# Patient Record
Sex: FEMALE | Race: BLACK OR AFRICAN AMERICAN | NOT HISPANIC OR LATINO | Employment: OTHER | URBAN - METROPOLITAN AREA
[De-identification: names, ages, dates, MRNs, and addresses within clinical notes are randomized per-mention and may not be internally consistent; named-entity substitution may affect disease eponyms.]

---

## 2018-12-30 ENCOUNTER — HOSPITAL ENCOUNTER (EMERGENCY)
Facility: OTHER | Age: 33
Discharge: HOME | End: 2018-12-30
Attending: EMERGENCY MEDICINE
Payer: MEDICAID

## 2018-12-30 VITALS
HEART RATE: 61 BPM | HEIGHT: 66 IN | OXYGEN SATURATION: 99 % | WEIGHT: 182 LBS | SYSTOLIC BLOOD PRESSURE: 140 MMHG | RESPIRATION RATE: 16 BRPM | TEMPERATURE: 99 F | DIASTOLIC BLOOD PRESSURE: 61 MMHG | BODY MASS INDEX: 29.25 KG/M2

## 2018-12-30 DIAGNOSIS — N94.9 ROUND LIGAMENT PAIN: Primary | ICD-10-CM

## 2018-12-30 LAB
B-HCG UR QL: POSITIVE
BILIRUB UR QL STRIP: NEGATIVE
CLARITY UR: CLEAR
COLOR UR: YELLOW
CTP QC/QA: YES
GLUCOSE UR QL STRIP: NEGATIVE
HGB UR QL STRIP: NEGATIVE
KETONES UR QL STRIP: NEGATIVE
LEUKOCYTE ESTERASE UR QL STRIP: NEGATIVE
NITRITE UR QL STRIP: NEGATIVE
PH UR STRIP: 6 [PH] (ref 5–8)
PROT UR QL STRIP: NEGATIVE
SP GR UR STRIP: 1.02 (ref 1–1.03)
URN SPEC COLLECT METH UR: NORMAL
UROBILINOGEN UR STRIP-ACNC: NEGATIVE EU/DL

## 2018-12-30 PROCEDURE — 81025 URINE PREGNANCY TEST: CPT | Performed by: EMERGENCY MEDICINE

## 2018-12-30 PROCEDURE — 99281 EMR DPT VST MAYX REQ PHY/QHP: CPT

## 2018-12-30 PROCEDURE — 25000003 PHARM REV CODE 250: Performed by: EMERGENCY MEDICINE

## 2018-12-30 PROCEDURE — 81003 URINALYSIS AUTO W/O SCOPE: CPT

## 2018-12-30 RX ORDER — ACETAMINOPHEN 500 MG
1000 TABLET ORAL
Status: COMPLETED | OUTPATIENT
Start: 2018-12-30 | End: 2018-12-30

## 2018-12-30 RX ADMIN — ACETAMINOPHEN 1000 MG: 500 TABLET, FILM COATED ORAL at 01:12

## 2018-12-30 NOTE — ED NOTES
Low bdomen pain and wraps around to the back, pain only to rt side. Pain started this am and has gotten worse. Denies N/V/D. No vaginal bleeding or discharge  LOC: Pt is awake alert and aware of environment, oriented X3 and speaking appropriately  Appearance: Pt is in no acute distress, Pt is well groomed and clean  Skin: skin is warm and dry with normal turgor, mucus membranes are moist and pink, skin is intact with no bruising or breakdown  Muskuloskeletal: Pt moves all extremities well, there is no obvious swelling or deformities noted, pulses are intact.  Respiratory: Airway is open and patent, respirations are spontaneous and even.  Cardiac: normal rate and rhythm, no edema and cap refill is <3sec  Abdomen: soft, slight tenderness to low rt side and mid abd tender and non-distended  Neuro: Pt follows commands easily and has no obvious deficits

## 2018-12-30 NOTE — ED PROVIDER NOTES
Encounter Date: 2018    SCRIBE #1 NOTE: Vasquez FERNANDEZ, darci scribing for, and in the presence of, Dr. Giordano.       History     Chief Complaint   Patient presents with    Abdominal Cramping     10 weeks pregnant. started this morning. denies any vaginal bleeding     Seen by provider: 12:55 AM    Patient is a 23 y.o. Female, A0 approximately 10 weeks gestational age, who presents to the ED with complaint of right sided lower abdominal pain which began this morning prior to arrival and has progressively worsened since onset. She rates the pain 10/10 in severity. She also reports dysuria and frequency. She denies fever, nausea, vomiting, diarrhea, vaginal bleeding or discharge. She denies experiencing similar pain in the past and has not had any pain in her pregnancy thus far. She denies any pregnancies with past pregnancies. She reports no major medical problems. She is here visiting from [place]. She has already established prenatal care with her OB/GYN in [place].      The history is provided by the patient.     Review of patient's allergies indicates:  No Known Allergies  History reviewed. No pertinent past medical history.  Past Surgical History:   Procedure Laterality Date    TONSILLECTOMY       History reviewed. No pertinent family history.  Social History     Tobacco Use    Smoking status: Never Smoker   Substance Use Topics    Alcohol use: No     Frequency: Never    Drug use: No     Review of Systems   Constitutional: Negative for fever.   HENT: Negative for sore throat.    Respiratory: Negative for shortness of breath.    Cardiovascular: Negative for chest pain.   Gastrointestinal: Positive for abdominal pain. Negative for diarrhea, nausea and vomiting.   Genitourinary: Positive for dysuria and frequency. Negative for vaginal bleeding and vaginal discharge.   Musculoskeletal: Negative for back pain.   Skin: Negative for rash.   Neurological: Negative for weakness and headaches.    Hematological: Does not bruise/bleed easily.   Psychiatric/Behavioral: Negative for confusion.       Physical Exam     Initial Vitals [12/30/18 0044]   BP Pulse Resp Temp SpO2   (!) 135/90 83 10 98.5 °F (36.9 °C) 98 %      MAP       --         Physical Exam    Nursing note and vitals reviewed.  Constitutional: She appears well-developed and well-nourished. She is not diaphoretic. No distress.   HENT:   Head: Normocephalic and atraumatic.   Eyes: Conjunctivae and EOM are normal. Pupils are equal, round, and reactive to light.   Neck: Normal range of motion. Neck supple.   Cardiovascular: Normal rate, regular rhythm and normal heart sounds. Exam reveals no gallop and no friction rub.    No murmur heard.  Pulmonary/Chest: Breath sounds normal. No respiratory distress. She has no wheezes. She has no rhonchi. She has no rales.   Abdominal: Soft. She exhibits no distension. There is tenderness. There is no rebound and no guarding.   Right adnexal tenderness. Right lower quadrant tenderness. Mild suprapubic tenderness. No right CVA tenderness. No tenderness at McBurney's point.    Musculoskeletal: Normal range of motion.   Neurological: She is alert and oriented to person, place, and time.   Skin: Skin is warm and dry.   Psychiatric: She has a normal mood and affect. Her behavior is normal. Judgment and thought content normal.         ED Course   Procedures  Labs Reviewed   POCT URINE PREGNANCY - Abnormal; Notable for the following components:       Result Value    POC Preg Test, Ur Positive (*)     All other components within normal limits   URINALYSIS, REFLEX TO URINE CULTURE    Narrative:     Preferred Collection Type->Urine, Clean Catch          Imaging Results    None          Medical Decision Making:   Clinical Tests:   Lab Tests: Ordered and Reviewed  ED Management:  Pregnant patient him from out of town presents complaining of right-sided pelvic pain that radiates through to her back.  Started this morning.  She  reports she is approximately 10 weeks and she has had an ultrasound in [place] demonstrating an IUP.  She reports she has had no problems with pregnancy.  She reports this is her 5th pregnancy, she never had pain like this with other pregnancies.  She has had no nausea vomiting or vaginal bleeding.  She does report some frequent urination.  Her tenderness is primarily in the right adnexal region it does track up to the right lower quadrant, does not reach McBurney's point.  She reports she feels the pain in her right flank, but she has no CVA tenderness on examination. I have considered appendicitis, round ligament pain, UTI, pyelonephritis, miscarriage, and constipation.  Exam is not especially consistent with appendicitis, she has only slight tenderness and is not at McBurney's point.  She has no nausea vomiting anorexia fever or tachycardia.  Urinalysis is negative for infection.  This does seem most consistent with round ligament pain. I have extensively counseled the patient and her partner that early appendicitis could presents similarly, and were she to develop fever nausea vomiting anorexia or any worsening pain we would need to re-evaluate her.  At this time I have recommended Tylenol to treat round ligament pain and no extensive physical activity.  She is also return here with any new vaginal bleeding as this would be concerning for a spontaneous .  As I do not have the ultrasound, only her report of a positive IUP, I cannot completely rule out ectopic and I have discussed this with her, though she does reassure me that she has had a demonstrative ultrasound with IUP at home.    I did have an extensive talk regarding signs to return for and need for follow up. Patient expressed understanding and will monitor symptoms closely and follow-up as needed.    DAVID Giordano M.D.  2018  1:41 AM              Yvette Attestation:   Jeremíasibe #1: I performed the above scribed service and the  documentation accurately describes the services I performed. I attest to the accuracy of the note.    Attending Attestation:           Physician Attestation for Scribe:  Physician Attestation Statement for Scribe #1: I, Dr. Giordano, reviewed documentation, as scribed by Vasquez Ashby in my presence, and it is both accurate and complete.                    Clinical Impression:     1. Round ligament pain                                Satish Giordano MD  12/30/18 0142

## 2019-12-26 ENCOUNTER — HOSPITAL ENCOUNTER (EMERGENCY)
Facility: OTHER | Age: 34
Discharge: HOME | End: 2019-12-26
Attending: EMERGENCY MEDICINE
Payer: MEDICAID

## 2019-12-26 VITALS
RESPIRATION RATE: 18 BRPM | HEART RATE: 78 BPM | HEIGHT: 66 IN | BODY MASS INDEX: 24.91 KG/M2 | TEMPERATURE: 98 F | OXYGEN SATURATION: 100 % | WEIGHT: 155 LBS | SYSTOLIC BLOOD PRESSURE: 157 MMHG | DIASTOLIC BLOOD PRESSURE: 108 MMHG

## 2019-12-26 DIAGNOSIS — J01.91 ACUTE RECURRENT SINUSITIS, UNSPECIFIED LOCATION: Primary | ICD-10-CM

## 2019-12-26 LAB
B-HCG UR QL: NEGATIVE
CTP QC/QA: YES

## 2019-12-26 PROCEDURE — 25000003 PHARM REV CODE 250: Performed by: EMERGENCY MEDICINE

## 2019-12-26 PROCEDURE — 99283 EMERGENCY DEPT VISIT LOW MDM: CPT

## 2019-12-26 PROCEDURE — 81025 URINE PREGNANCY TEST: CPT | Performed by: EMERGENCY MEDICINE

## 2019-12-26 RX ORDER — KETOROLAC TROMETHAMINE 30 MG/ML
15 INJECTION, SOLUTION INTRAMUSCULAR; INTRAVENOUS
Status: DISCONTINUED | OUTPATIENT
Start: 2019-12-26 | End: 2019-12-26 | Stop reason: SDUPTHER

## 2019-12-26 RX ORDER — AMOXICILLIN AND CLAVULANATE POTASSIUM 875; 125 MG/1; MG/1
1 TABLET, FILM COATED ORAL 2 TIMES DAILY
Qty: 20 TABLET | Refills: 0 | Status: SHIPPED | OUTPATIENT
Start: 2019-12-26 | End: 2020-01-05

## 2019-12-26 RX ORDER — AMOXICILLIN AND CLAVULANATE POTASSIUM 875; 125 MG/1; MG/1
1 TABLET, FILM COATED ORAL
Status: COMPLETED | OUTPATIENT
Start: 2019-12-26 | End: 2019-12-26

## 2019-12-26 RX ORDER — KETOROLAC TROMETHAMINE 30 MG/ML
15 INJECTION, SOLUTION INTRAMUSCULAR; INTRAVENOUS
Status: DISCONTINUED | OUTPATIENT
Start: 2019-12-26 | End: 2019-12-26 | Stop reason: HOSPADM

## 2019-12-26 RX ADMIN — AMOXICILLIN AND CLAVULANATE POTASSIUM 1 TABLET: 875; 125 TABLET, FILM COATED ORAL at 08:12

## 2019-12-27 NOTE — ED TRIAGE NOTES
Patient present to ER with c/o headache with left ear and left sided neck pain for the past week.  Pain 7/10 at present.  Patient denies fever.  Patient reports taking OTC medication with no relieve.

## 2019-12-27 NOTE — ED PROVIDER NOTES
"Encounter Date: 12/26/2019    SCRIBE #1 NOTE: I, Yuliana Fields, am scribing for, and in the presence of, Dr. Madrid.       History     Chief Complaint   Patient presents with    Headache     Reports nontraumatic posterior headache that radiates to left ear and generalized face x1 week with "black floater," to left eye. Reports symptoms unrelieved with tylenol sinus pressure     Time seen by provider: 8:18 PM    This is a 34 y.o. female who presents with complaint of headache for the past week. Patient reports associated congestion, sinus pressure, neck pain, and left ear pain. Her symptoms were unrelieved with "tylenol for sinus pain and pressure." She denies fever, rhinorrhea, cough, chest pain, or dyspnea. No sick contacts. Patient notes that she is currently breastfeeding; she is five months postpartum. She notes that she is currently visiting from [place].     The history is provided by the patient and medical records.     Review of patient's allergies indicates:  No Known Allergies  History reviewed. No pertinent past medical history.  Past Surgical History:   Procedure Laterality Date    TONSILLECTOMY       History reviewed. No pertinent family history.  Social History     Tobacco Use    Smoking status: Never Smoker   Substance Use Topics    Alcohol use: No     Frequency: Never    Drug use: No     Review of Systems   ROS: As per HPI and below:   General: No fever.   HENT: No facial pain. No rhinorrhea. Positive for left ear pain. Positive for congestion. Positive for sinus pressure.  Eyes: Negative for eye pain.   Cardiovascular: No chest pain.   Respiratory:  No dyspnea.   GI: No abdominal pain. No nausea. No vomiting. No diarrhea.   Skin: No rashes.   Neuro:  No syncope.  No focal deficits. Positive for headache.  Musculoskeletal: No extremity pain. Positive for neck pain.  All other systems reviewed and are negative.    Physical Exam     Initial Vitals [12/26/19 1831]   BP Pulse Resp Temp SpO2   (!) " "156/94 88 18 98.4 °F (36.9 °C) 100 %      MAP       --         Physical Exam   BP (!) 157/108 (BP Location: Right arm, Patient Position: Sitting) Comment: Nure and Provider aware   Pulse 78   Temp 97.6 °F (36.4 °C) (Oral)   Resp 18   Ht 5' 6" (1.676 m)   Wt 70.3 kg (155 lb)   SpO2 100%   BMI 25.02 kg/m²   Nursing note and vitals reviewed.  Constitutional: AAOx3.   Eyes: EOMI. No discharge. Anicteric.  HENT:   Mouth/Throat: Oropharynx is clear and moist. Uvula midline. No tonsillar edema.  No tonsillar exudates.   Neck: Normal range of motion. Neck supple. Tender anterior and posterior lymphadenopathy. No muffled voice.  Ears: Right ear: Large amount of debris and cerumen in the canal which obscures view of TM.  Left TM opacification and effusion with no erythema. Both external canals normal. No pain on pinna movement biterally.   Nose: Turbinate edema right worse than left.  Cardiovascular: Normal rate  Pulmonary/Chest: No respiratory distress.   Abdominal: No distension   Musculoskeletal: Normal range of motion.   Neurological: GCS15. Alert and oriented to person, place, and time. No gross focal strength or light touch deficit.   Skin: Skin is warm and dry.   Psychiatric: Behavior is normal. Judgment normal.      ED Course   Procedures  Labs Reviewed   POCT URINE PREGNANCY          Imaging Results    None          Medical Decision Making:   History:   Old Medical Records: I decided to obtain old medical records.  Clinical Tests:   Lab Tests: Reviewed            Scribe Attestation:   Scribe #1: I performed the above scribed service and the documentation accurately describes the services I performed. I attest to the accuracy of the note.    Attending Attestation:           Physician Attestation for Scribe:  Physician Attestation Statement for Scribe #1: I, Dr. Madrid, reviewed documentation, as scribed by Yuliana Fields in my presence, and it is both accurate and complete.         Attending ED Notes:   Patient " is a 34-year-old female who presents with sinus pressure, mild rhinorrhea, left ear pain for the last several days.  She denies any associated fevers.  On exam patient has turbinate edema, left TM opacification and diffusion, large amount of debris in the right can now however right external canal does not appear to have otitis externa changes.  My impression is acute sinusitis and otitis media.  Patient meets criteria for antibiotic treatment for her sinusitis.  I discussed maximal supportive care, the risks and benefits of her specific medication treatments given her current breast-feeding status.    I discussed with patient and/or guardian/caretaker that this evaluation in the ED does not suggest any emergent or life threatening medical condition requiring admission or immediate intervention beyond what was provided in the ED. Regardless, an unremarkable evaluation in the ED does not preclude the development or presence of a serious or life threatening condition. As such, patient was instructed to return immediately for any worsening or change in current symptoms.     I had a detailed discussion with patient  and/or guardian/caretaker regarding findings, plan, return precautions, importance of medication adherence, need to follow-up with a PCP and specialist. All questions answered.     Note was created using voice recognition software. It may have occasional typographical errors not identified and edited despite initial review prior to signing.            ED Course as of Dec 28 0742   Thu Dec 26, 2019   [address], 34 y.o.  presented to the ED complaining of headache that started one week ago. She reports associated nasal congestion with associated earache on the left and left sided neck pain.  Patient is still breastfeeding and is 5 months post partum.     Patient seen and medically screened by myself in the Provider in Triage Sort system due to ED crowding.  Appropriate tests and/or  medications ordered.  A medical screening exam has been performed.  The care will be assumed by myself or another provider when treatment space becomes available.  I am not assuming care of this patient at this time. 7:35 PM. JOSAFAT AMAYA        [AM]      ED Course User Index  [AM] Yolette Colvin PA-C                Clinical Impression:     1. Acute recurrent sinusitis, unspecified location                                Clive Madrid MD  12/28/19 0742

## 2019-12-27 NOTE — DISCHARGE INSTRUCTIONS
Call your primary care doctor to make the first available appointment.     Keep all your medical appointments.     Take your regular medication as prescribed. Contact your primary care provider before running out of medication, or for any problems obtaining them.    Do not drive or operate heavy machinery while taking opioid or muscle relaxing medications. Examples include norco, percocet, xanax, valium, flexeril.     Overuse or long term use of pain and sedating medication may lead to addiction, dependence, organ failure, family and work problems, legal problems, accidental overdose and death.    If you do not have health insurance, you probably qualify for heavily discounted rates:  Call 1-811.298.6867 ([facility] hotline) or go to www.[employer].[place].gov    Your evaluation in the ED does not suggest any emergent or life threatening medical condition requiring admission or immediate intervention beyond that provided in the ED.   However, the signs of a serious problem sometimes take more time to appear.   RETURN TO THE ER if any of the following occur:    Weakness, dizziness, fainting, or loss of consciousness   Fever of 100.4ºF (38ºC) or higher  Any worse symptoms  Any new or concerning symptoms

## 2019-12-27 NOTE — ED NOTES
"B/p elevated at time of d/c. Pt states: "I stopped taking that medication, because it made me feel weird. I was diagnosed with "post eclampsia". I decided to go with the holistic route." She c/o of a headache at this time. She denies any CP,SOB,weakness or visual disturbances. MD notified of abnormal vital signs, no new orders given. Pt will be d/c'd with strict follow up orders per verbal orders from MD. Will continue with POC.   "